# Patient Record
Sex: FEMALE | NOT HISPANIC OR LATINO | Employment: FULL TIME | URBAN - METROPOLITAN AREA
[De-identification: names, ages, dates, MRNs, and addresses within clinical notes are randomized per-mention and may not be internally consistent; named-entity substitution may affect disease eponyms.]

---

## 2022-02-25 ENCOUNTER — IMMUNIZATION (OUTPATIENT)
Dept: NURSING | Facility: CLINIC | Age: 42
End: 2022-02-25
Payer: COMMERCIAL

## 2022-02-25 PROCEDURE — 91306 COVID-19,PF,MODERNA (18+ YRS BOOSTER .25ML): CPT

## 2022-02-25 PROCEDURE — 0064A COVID-19,PF,MODERNA (18+ YRS BOOSTER .25ML): CPT

## 2022-04-30 ENCOUNTER — HEALTH MAINTENANCE LETTER (OUTPATIENT)
Age: 42
End: 2022-04-30

## 2022-09-20 ENCOUNTER — OFFICE VISIT (OUTPATIENT)
Dept: FAMILY MEDICINE | Facility: CLINIC | Age: 42
End: 2022-09-20
Payer: COMMERCIAL

## 2022-09-20 ENCOUNTER — TELEPHONE (OUTPATIENT)
Dept: ALLERGY | Facility: CLINIC | Age: 42
End: 2022-09-20

## 2022-09-20 VITALS
WEIGHT: 153 LBS | RESPIRATION RATE: 16 BRPM | OXYGEN SATURATION: 99 % | TEMPERATURE: 98.6 F | DIASTOLIC BLOOD PRESSURE: 56 MMHG | SYSTOLIC BLOOD PRESSURE: 108 MMHG | HEART RATE: 82 BPM

## 2022-09-20 DIAGNOSIS — J30.2 SEASONAL ALLERGIC RHINITIS, UNSPECIFIED TRIGGER: Primary | ICD-10-CM

## 2022-09-20 PROCEDURE — 99203 OFFICE O/P NEW LOW 30 MIN: CPT | Performed by: NURSE PRACTITIONER

## 2022-09-20 RX ORDER — MONTELUKAST SODIUM 10 MG/1
10 TABLET ORAL AT BEDTIME
Qty: 30 TABLET | Refills: 12 | Status: SHIPPED | OUTPATIENT
Start: 2022-09-20 | End: 2023-09-11

## 2022-09-20 ASSESSMENT — ENCOUNTER SYMPTOMS
WHEEZING: 1
SORE THROAT: 1

## 2022-09-20 NOTE — PROGRESS NOTES
Assessment & Plan     (J30.2) Seasonal allergic rhinitis, unspecified trigger  (primary encounter diagnosis)  Comment:   Plan: Adult Allergy/Asthma Referral, montelukast         (SINGULAIR) 10 MG tablet        Symptoms likely related to allergies, but could be residual viral symptoms or low-grade sinusitis.  May try Singulair.  Discussed the use and indication of this medication as well as potential side effects.  Referral to allergy given per patient request.         23 minutes spent on the date of the encounter doing chart review, patient visit and documentation            No follow-ups on file.    Karin Recio NP  [facility]    Ronni Morse is a 41 year old, presenting for the following health issues:  Nasal Congestion, Pharyngitis, and Allergies (Referral for allergy testing)      Pharyngitis     Allergies  Associated symptoms include a sore throat.   History of Present Illness       Reason for visit:  Sinus drainage, throat discomfort  Symptom onset:  3-4 weeks ago    She eats 0-1 servings of fruits and vegetables daily.She consumes 0 sweetened beverage(s) daily.She exercises with enough effort to increase her heart rate 9 or less minutes per day.    She is taking medications regularly.       Post-nasal drainage, sore throat, slight cough started about a month ago.  No fevers, nasal congestion, sinus pressure.  Antihistamine cause too much dryness.  She has also tried Flonase in the past as well.   She uses a nettipot.  Typically she has similar symptoms in the spring.  She did do a home COVID test initially and it was negative.  Her symptoms are starting to improve.             Review of Systems   HENT: Positive for sore throat.    Respiratory: Positive for wheezing.             Objective    /56   Pulse 82   Temp 98.6  F (37  C) (Temporal)   Resp 16   Wt 69.4 kg (153 lb)   SpO2 99%   Breastfeeding No   There is no height or weight on file to calculate  BMI.  Physical Exam   GENERAL: healthy, alert and no distress  HENT: ear canals and TM's normal, nose and mouth without ulcers or lesions  NECK: no adenopathy, no asymmetry, masses, or scars and thyroid normal to palpation  RESP: lungs clear to auscultation - no rales, rhonchi or wheezes  CV: regular rate and rhythm, normal S1 S2, no S3 or S4, no murmur, click or rub, no peripheral edema and peripheral pulses strong  PSYCH: mentation appears normal, affect normal/bright

## 2022-09-20 NOTE — TELEPHONE ENCOUNTER
[facility] Call Center    Phone Message    May a detailed message be left on voicemail: yes     Reason for Call: Other: Pt requests call back to answer her questions before her Allergy Appt - she wants to know what all kinds of testing you can provide - she said she wants a lot of testing - she also wants to know what body parts you do the testing on and if she will be sore etc after to plan her day and next day etc - she has some questions on what happens at an Appt - Thank you for calling her back - she is scheduled/wait listed     Action Taken: Message routed to:  Other: CS ALLERGY    Travel Screening: Not Applicable

## 2022-09-30 NOTE — TELEPHONE ENCOUNTER
Called patient and left a 2nd voicemail for callback to discuss questions about allergy appointment with Dr. Dudley.     Amanda Haas, BSN, RN

## 2022-10-03 NOTE — TELEPHONE ENCOUNTER
Called patient back after she called the clinic to discuss allergy testing. Explained that there is two types of allergy testing: patch and skin prick testing. Skin prick testing is what we do in the [place] office with Dr. Dudley. Patient concerned for seasonal allergies as they have gotten worse over the last couple of months. Patient is wanting to do all the testing at once so that she does not have to wonder what she is allergic to any longer. Informed patient that testing is performed on the back because this is where there is the most surface area. Reminded patient to remain off antihistamines for at least 7 days prior to her appointment, patient not currently taking. No further questions at this time.     Amanda Haas, MACN, RN

## 2022-11-20 ENCOUNTER — HEALTH MAINTENANCE LETTER (OUTPATIENT)
Age: 42
End: 2022-11-20

## 2022-12-20 ENCOUNTER — OFFICE VISIT (OUTPATIENT)
Dept: ALLERGY | Facility: CLINIC | Age: 42
End: 2022-12-20
Attending: NURSE PRACTITIONER
Payer: COMMERCIAL

## 2022-12-20 ENCOUNTER — LAB (OUTPATIENT)
Dept: LAB | Facility: CLINIC | Age: 42
End: 2022-12-20
Payer: COMMERCIAL

## 2022-12-20 VITALS — HEART RATE: 64 BPM | SYSTOLIC BLOOD PRESSURE: 124 MMHG | OXYGEN SATURATION: 100 % | DIASTOLIC BLOOD PRESSURE: 83 MMHG

## 2022-12-20 DIAGNOSIS — R09.82 POST-NASAL DRIP: ICD-10-CM

## 2022-12-20 DIAGNOSIS — R09.82 POST-NASAL DRIP: Primary | ICD-10-CM

## 2022-12-20 DIAGNOSIS — J30.2 SEASONAL ALLERGIC RHINITIS, UNSPECIFIED TRIGGER: ICD-10-CM

## 2022-12-20 PROCEDURE — 86003 ALLG SPEC IGE CRUDE XTRC EA: CPT | Mod: 59

## 2022-12-20 PROCEDURE — 86003 ALLG SPEC IGE CRUDE XTRC EA: CPT

## 2022-12-20 PROCEDURE — 95004 PERQ TESTS W/ALRGNC XTRCS: CPT | Performed by: INTERNAL MEDICINE

## 2022-12-20 PROCEDURE — 36415 COLL VENOUS BLD VENIPUNCTURE: CPT

## 2022-12-20 PROCEDURE — 99203 OFFICE O/P NEW LOW 30 MIN: CPT | Mod: 25 | Performed by: INTERNAL MEDICINE

## 2022-12-20 ASSESSMENT — ENCOUNTER SYMPTOMS
RHINORRHEA: 0
CHEST TIGHTNESS: 0
SHORTNESS OF BREATH: 0
COUGH: 0
FACIAL SWELLING: 0
ACTIVITY CHANGE: 0
EYE ITCHING: 0
FEVER: 0
MYALGIAS: 0
WHEEZING: 0
VOMITING: 0
JOINT SWELLING: 0
NAUSEA: 0
EYE REDNESS: 0
EYE DISCHARGE: 0
DIARRHEA: 0
HEADACHES: 0
ADENOPATHY: 0
ARTHRALGIAS: 0
SINUS PRESSURE: 0
CHILLS: 0

## 2022-12-20 NOTE — LETTER
12/20/2022         RE: Lito Castillo  [address] [facility] [address]        Dear Colleague,    Thank you for referring your patient, Lito Castillo, to the [facility] [place]. Please see a copy of my visit note below.    Lito Castillo was seen in the Allergy Clinic at [facility].    Lito Castillo is a 42 year old female being seen today at the request of Karin Recio NP [facility]  in consultation for Seasonal allergic rhinitis    She has had several years of postnasal drainage that was primarily in the spring which was debilitating.  Her symptoms continued to get worse and now are in the spring and fall.  She has tried multiple over-the-counter antihistamines such as Zyrtec and Allegra but it makes her feel incredibly dry and parched and does not provide any benefit.  She is dreading spring.  Singulair was prescribed but she did not try this.  She has not used any nasal sprays.    She also develops a sore throat and occasional chest congestion when she has increased postnasal drainage.  She does not complain of any eye itching or sneezing.    History reviewed. No pertinent past medical history.  History reviewed. No pertinent family history.  History reviewed. No pertinent surgical history.    ENVIRONMENTAL HISTORY:   Pets inside the house include 1 dog(s).  Do you smoke cigarettes or other recreational drugs? No There is/are 0 smokers living in the house. The house does not have a damp basement.     SOCIAL HISTORY:   Lito is employed as . She lives with her family.      Review of Systems   Constitutional: Negative for activity change, chills and fever.   HENT: Negative for congestion, dental problem, ear pain, facial swelling, nosebleeds, postnasal drip, rhinorrhea, sinus pressure and sneezing.    Eyes: Negative for discharge, redness and itching.   Respiratory: Negative for cough, chest tightness,  shortness of breath and wheezing.    Cardiovascular: Negative for chest pain.   Gastrointestinal: Negative for diarrhea, nausea and vomiting.   Musculoskeletal: Negative for arthralgias, joint swelling and myalgias.   Skin: Negative for rash.   Neurological: Negative for headaches.   Hematological: Negative for adenopathy.   Psychiatric/Behavioral: Negative for behavioral problems and self-injury.   All other systems reviewed and are negative.        Current Outpatient Medications:      montelukast (SINGULAIR) 10 MG tablet, Take 1 tablet (10 mg) by mouth At Bedtime (Patient not taking: Reported on 12/20/2022), Disp: 30 tablet, Rfl: 12  No Known Allergies      EXAM:   /83   Pulse 64   SpO2 100%     Physical Exam    Constitutional:       General: She is not in acute distress.     Appearance: Normal appearance. She is not ill-appearing.   HENT:      Head: Normocephalic and atraumatic.      Nose: Nose normal. No congestion or rhinorrhea.      Mouth/Throat:      Mouth: Mucous membranes are moist.      Pharynx: Oropharynx is clear. No posterior oropharyngeal erythema.   Eyes:      General:         Right eye: No discharge.         Left eye: No discharge.   Cardiovascular:      Rate and Rhythm: Normal rate and regular rhythm.      Heart sounds: Normal heart sounds.   Pulmonary:      Effort: Pulmonary effort is normal.      Breath sounds: Normal breath sounds. No wheezing or rhonchi.   Skin:     General: Skin is warm.      Findings: No erythema or rash.   Neurological:      General: No focal deficit present.      Mental Status: She is alert. Mental status is at baseline.   Psychiatric:         Mood and Affect: Mood normal.         Behavior: Behavior normal.     WORKUP: Skin testing was borderline positive to 2 trees.    ENVIRONMENTAL PERCUTANEOUS SKIN TESTING: ADULT  [facility] 12/20/2022   Consent Y   Ordering Physician Dr. Dudley   Interpreting Physician Dr. Dudley   Testing Technician Amanda WASSERMAN RN    Location Back   Time start:  1:29 PM   Time End:  1:44 PM   Positive Control: Histatrol*ALK 1 mg/ml 7/20   Negative Control: 50% Glycerin 0   Cat Hair*ALK (10,000 BAU/ml) 0   AP Dog Hair/Dander (1:100 w/v) 0   Dust Mite p. 30,000 AU/ml 0   Dust Mite f. (30,000 AU/ml) 0   Harsha (W/F in millimeters) 0   Darian Grass (100,000 BAU/mL) 0   Red Cedar (W/F in millimeters) 4/5   Maple/[place] (W/F in millimeters) 0   Hackberry (W/F in millimeters) 0   [place] (W/F in millimeters) 0   Calypso *ALK (W/F in millimeters) 0   American Elm (W/F in millimeters) 0   Circle (W/F in millimeters) 0   Black [place] (W/F in millimeters) 5/6   Birch Mix (W/F in millimeters) 0   Sabael (W/F in millimeters) 0   Oak (W/F in millimeters) 0   Cocklebur (W/F in millimeters) 0   [place] (W/F in millimeters) 0   White Rolly (W/F in millimeters) 0   Careless (W/F in millimeters) 0   Nettle (W/F in millimeters) 0   English Plantain (W/F in millimeters) 0   Kochia (W/F in millimeters) 0   Lamb's Quarter (W/F in millimeters) 0   Marshelder (W/F in millimeters) 0   Ragweed Mix* ALK (W/F in millimeters) 0   Russian Thistle (W/F in millimeters) 0   Sagebrush/Mugwort (W/F in millimeters) 0   Sheep Sorrel (W/F in millimeters) 0   Feather Mix* ALK (W/F in millimeters) 0   Penicillium Mix (1:10 w/v) 0   Curvularia spicifera (1:10 w/v) 0   Epicoccum (1:10 w/v) 0   Aspergillus fumigatus (1:10 w/v): 0   Alternaria tenius (1:10 w/v) 0   H. Cladosporium (1:10 w/v) 0   Phoma herbarum (1:10 w/v) 0        Verbal consent obtained for skin testing  ASSESSMENT/PLAN:  Lito Castillo is a 42 year old female seen today for allergy concerns.  Skin testing was borderline positive to 2 trees.  We will do additional blood testing for additional trees and weed allergy based on her history.  Symptoms are seasonal.  This does affect her quality of life.    1. Nasal saline irrigation  2. Flonase Sensimist 1-2 sprays each nostril daily  3. Astepro or Atrovent could be  considered  4. Allergy blood tests, if positive may consider allergy shots in the future.    Follow-up in 4 months      Thank you for allowing me to participate in the care of Lito Castillo.      I spent 30 minutes on the date of the encounter doing chart review, history and exam, documentation and further coordination as noted above exclusive of separately reported interpretations    Pee Dudley MD  Allergy/Immunology  [facility]        Per provider verbal order, placed Adult Environmental Panel scratch test. Once panels were placed, patient was monitored for 15 minutes in clinic.  Provider read test after 15 minutes.  Pt tolerated procedure well.  All questions and concerns were addressed at office visit.     MAC CrowN, RN                Again, thank you for allowing me to participate in the care of your patient.        Sincerely,        Pee Dudley MD

## 2022-12-20 NOTE — PATIENT INSTRUCTIONS
Nasal saline irrigation  Flonase Sensimist 1-2 sprays each nostril daily  Astepro or Atrovent could be considered  Allergy blood tests, if positive may consider allergy shots in the future.          Allergy Staff Appt Hours Shot Hours Location         Physician   Pee Dudley MD      Support Staff   Amanda MARES, RN   Deniz MARIE, RN   Nick PARRA, RAFAELA LOPEZ, LPN          Mondays  Not available until January/2023 Wednesdays  Close    Tuesdays Thursdays and Fridays:  [place] 7-5                    [place]     Tuesdays: 7:40-3:20      Fridays: 7:40-4:20                [facility]  [address]  [place] [address]  Appt Line: [phone]    Pulmonary Function Scheduling:  [place]: [phone]         Questions about cost of your care?  For questions about your cost of your visit, procedure, lab or imaging contact: [employer] Price Line [phone] or visit: [email or web address]    Important Scheduling Information  Appointments for skin testing: Appointment will last approximately 45 minutes.  Please call the appointment line for your clinic to schedule.  Discontinue oral antihistamines 7 days prior to the appointment.  Discontinue nasal and ocular antihistamines 4 days prior to appointment.    Appointments for challenges (oral food challenge, penicillin testing, aspirin desensitization) If your provider instructs you to that this additional testing is indicated, it will need to be scheduled directly through the allergy department.  Please contact them via [employer] or by calling the clinic and asking to speak with the allergy team.  They will provide additional information and instructions for the appointment.  Discontinue oral antihistamines 7 days prior to the appointment.  Discontinue nasal and ocular antihistamines 4 days prior to the appointment.    Thank you for trusting us with your care. Please feel free to contact us with any questions  or concerns you may have.,,

## 2022-12-20 NOTE — PROGRESS NOTES
Per provider verbal order, placed Adult Environmental Panel scratch test. Once panels were placed, patient was monitored for 15 minutes in clinic.  Provider read test after 15 minutes.  Pt tolerated procedure well.  All questions and concerns were addressed at office visit.     MAC CrowN, RN

## 2022-12-20 NOTE — PROGRESS NOTES
Lito Castillo was seen in the Allergy Clinic at [facility].    Lito Castillo is a 42 year old female being seen today at the request of Karin Recio NP [facility]  in consultation for Seasonal allergic rhinitis    She has had several years of postnasal drainage that was primarily in the spring which was debilitating.  Her symptoms continued to get worse and now are in the spring and fall.  She has tried multiple over-the-counter antihistamines such as Zyrtec and Allegra but it makes her feel incredibly dry and parched and does not provide any benefit.  She is dreading spring.  Singulair was prescribed but she did not try this.  She has not used any nasal sprays.    She also develops a sore throat and occasional chest congestion when she has increased postnasal drainage.  She does not complain of any eye itching or sneezing.    History reviewed. No pertinent past medical history.  History reviewed. No pertinent family history.  History reviewed. No pertinent surgical history.    ENVIRONMENTAL HISTORY:   Pets inside the house include 1 dog(s).  Do you smoke cigarettes or other recreational drugs? No There is/are 0 smokers living in the house. The house does not have a damp basement.     SOCIAL HISTORY:   Lito is employed as . She lives with her family.      Review of Systems   Constitutional: Negative for activity change, chills and fever.   HENT: Negative for congestion, dental problem, ear pain, facial swelling, nosebleeds, postnasal drip, rhinorrhea, sinus pressure and sneezing.    Eyes: Negative for discharge, redness and itching.   Respiratory: Negative for cough, chest tightness, shortness of breath and wheezing.    Cardiovascular: Negative for chest pain.   Gastrointestinal: Negative for diarrhea, nausea and vomiting.   Musculoskeletal: Negative for arthralgias, joint swelling and myalgias.   Skin: Negative for rash.   Neurological:  Negative for headaches.   Hematological: Negative for adenopathy.   Psychiatric/Behavioral: Negative for behavioral problems and self-injury.   All other systems reviewed and are negative.        Current Outpatient Medications:      montelukast (SINGULAIR) 10 MG tablet, Take 1 tablet (10 mg) by mouth At Bedtime (Patient not taking: Reported on 12/20/2022), Disp: 30 tablet, Rfl: 12  No Known Allergies      EXAM:   /83   Pulse 64   SpO2 100%     Physical Exam    Constitutional:       General: She is not in acute distress.     Appearance: Normal appearance. She is not ill-appearing.   HENT:      Head: Normocephalic and atraumatic.      Nose: Nose normal. No congestion or rhinorrhea.      Mouth/Throat:      Mouth: Mucous membranes are moist.      Pharynx: Oropharynx is clear. No posterior oropharyngeal erythema.   Eyes:      General:         Right eye: No discharge.         Left eye: No discharge.   Cardiovascular:      Rate and Rhythm: Normal rate and regular rhythm.      Heart sounds: Normal heart sounds.   Pulmonary:      Effort: Pulmonary effort is normal.      Breath sounds: Normal breath sounds. No wheezing or rhonchi.   Skin:     General: Skin is warm.      Findings: No erythema or rash.   Neurological:      General: No focal deficit present.      Mental Status: She is alert. Mental status is at baseline.   Psychiatric:         Mood and Affect: Mood normal.         Behavior: Behavior normal.     WORKUP: Skin testing was borderline positive to 2 trees.    ENVIRONMENTAL PERCUTANEOUS SKIN TESTING: ADULT  [facility] 12/20/2022   Consent Y   Ordering Physician Dr. Dudley   Interpreting Physician Dr. Dudley   Testing Technician Amanda WASSERMAN RN   Location Back   Time start:  1:29 PM   Time End:  1:44 PM   Positive Control: Histatrol*ALK 1 mg/ml 7/20   Negative Control: 50% Glycerin 0   Cat Hair*ALK (10,000 BAU/ml) 0   AP Dog Hair/Dander (1:100 w/v) 0   Dust Mite p. 30,000 AU/ml 0   Dust Mite f. (30,000  AU/ml) 0   Harsha (W/F in millimeters) 0   Darian Grass (100,000 BAU/mL) 0   Red Cedar (W/F in millimeters) 4/5   Maple/[place] (W/F in millimeters) 0   Hackberry (W/F in millimeters) 0   Divide (W/F in millimeters) 0   [place] *ALK (W/F in millimeters) 0   American Elm (W/F in millimeters) 0   [place] (W/F in millimeters) 0   Black [place] (W/F in millimeters) 5/6   Birch Mix (W/F in millimeters) 0   Walker (W/F in millimeters) 0   Oak (W/F in millimeters) 0   Cocklebur (W/F in millimeters) 0   [place] (W/F in millimeters) 0   White Rolly (W/F in millimeters) 0   Careless (W/F in millimeters) 0   Nettle (W/F in millimeters) 0   English Plantain (W/F in millimeters) 0   Kochia (W/F in millimeters) 0   Lamb's Quarter (W/F in millimeters) 0   Marshelder (W/F in millimeters) 0   Ragweed Mix* ALK (W/F in millimeters) 0   Russian Thistle (W/F in millimeters) 0   Sagebrush/Mugwort (W/F in millimeters) 0   Sheep Sorrel (W/F in millimeters) 0   Feather Mix* ALK (W/F in millimeters) 0   Penicillium Mix (1:10 w/v) 0   Curvularia spicifera (1:10 w/v) 0   Epicoccum (1:10 w/v) 0   Aspergillus fumigatus (1:10 w/v): 0   Alternaria tenius (1:10 w/v) 0   H. Cladosporium (1:10 w/v) 0   Phoma herbarum (1:10 w/v) 0        Verbal consent obtained for skin testing  ASSESSMENT/PLAN:  Lito Castillo is a 42 year old female seen today for allergy concerns.  Skin testing was borderline positive to 2 trees.  We will do additional blood testing for additional trees and weed allergy based on her history.  Symptoms are seasonal.  This does affect her quality of life.    1. Nasal saline irrigation  2. Flonase Sensimist 1-2 sprays each nostril daily  3. Astepro or Atrovent could be considered  4. Allergy blood tests, if positive may consider allergy shots in the future.    Follow-up in 4 months      Thank you for allowing me to participate in the care of Lito Castillo.      I spent 30 minutes on the date of the encounter doing chart  review, history and exam, documentation and further coordination as noted above exclusive of separately reported interpretations    Pee Dudley MD  Allergy/Immunology  [facility]

## 2022-12-22 LAB
CALIF WALNUT POLN IGE QN: <0.1 KU(A)/L
CEDAR IGE QN: <0.1 KU(A)/L
COMMON RAGWEED IGE QN: <0.1 KU(A)/L
COTTONWOOD IGE QN: <0.1 KU(A)/L
EAST WHITE PINE IGE QN: <0.1 KU(A)/L
ENGL PLANTAIN IGE QN: <0.1 KU(A)/L
FIREBUSH IGE QN: <0.1 KU(A)/L
GIANT RAGWEED IGE QN: <0.1 KU(A)/L
GOOSEFOOT IGE QN: <0.1 KU(A)/L
MAPLE IGE QN: <0.1 KU(A)/L
RED MULBERRY IGE QN: <0.1 KU(A)/L
SALTWORT IGE QN: <0.1 KU(A)/L
SILVER BIRCH IGE QN: <0.1 KU(A)/L
TIMOTHY IGE QN: <0.1 KU(A)/L
WHITE ASH IGE QN: <0.1 KU(A)/L
WHITE ELM IGE QN: <0.1 KU(A)/L
WHITE MULBERRY IGE QN: <0.1 KU(A)/L
WHITE OAK IGE QN: <0.1 KU(A)/L

## 2023-02-23 ENCOUNTER — PRENATAL OFFICE VISIT (OUTPATIENT)
Dept: NURSING | Facility: CLINIC | Age: 43
End: 2023-02-23

## 2023-02-23 VITALS — HEIGHT: 65 IN | BODY MASS INDEX: 25.86 KG/M2

## 2023-02-23 DIAGNOSIS — Z23 NEED FOR TDAP VACCINATION: ICD-10-CM

## 2023-02-23 DIAGNOSIS — O09.529 ENCOUNTER FOR SUPERVISION OF MULTIGRAVIDA WITH ADVANCED MATERNAL AGE: Primary | ICD-10-CM

## 2023-02-23 PROBLEM — D17.20 LIPOMA OF AXILLA: Status: ACTIVE | Noted: 2017-06-07

## 2023-02-23 PROBLEM — N61.20 GRANULOMATOUS MASTITIS: Status: ACTIVE | Noted: 2020-05-16

## 2023-02-23 PROBLEM — R87.810 CERVICAL HIGH RISK HPV (HUMAN PAPILLOMAVIRUS) TEST POSITIVE: Status: ACTIVE | Noted: 2021-08-31

## 2023-02-23 PROCEDURE — 99207 PR NO CHARGE NURSE ONLY: CPT

## 2023-02-23 NOTE — PROGRESS NOTES
Important Information for Provider:     New ob nurse intake by phone, second pregnancy,AMA . Handouts reviewed. Declined genetic screening. Ultrasound scheduled for 3/03/23 with CNM to call with results. NOB with CNM 3/10/23    Caffeine intake/servings daily - 1  Calcium intake/servings daily - 3  Exercise 5 times weekly - describe ;walks, precautions givne  Sunscreen used - Yes  Seatbelts used - Yes  Guns stored in the home - No  Self Breast Exam - Yes  Pap test up to date -  Yes  Dental exam up to date -  Yes  Immunizations reviewed and up to date - Yes  Abuse: Current or Past (Physical, Sexual or Emotional) - No  Do you feel safe in your environment - Yes  Do you cope well with stress - Yes    Prenatal OB Questionnaire  Patient supplied answers from flow sheet for:  Prenatal OB Questionnaire.  Past Medical History  Have you ever received care for your mental health? : No  Have you ever been in a major accident or suffered serious trauma?: No  Within the last year, has anyone hit, slapped, kicked or otherwise hurt you?: No  In the last year, has anyone forced you to have sex when you didn't want to?: No    Past Medical History 2   Have you ever received a blood transfusion?: No  Would you accept a blood transfusion if was medically recommended?: Yes  Does anyone in your home smoke?: No   Is your blood type Rh negative?: No  Have you ever ?: (!) Yes  Have you been hospitalized for a nonsurgical reason excluding normal delivery?: No  Have you ever had an abnormal pap smear?: (!) Yes    Past Medical History (Continued)  Do you have a history of abnormalities of the uterus?: No  Did your mother take SARAH or any other hormones when she was pregnant with you?: No  Do you have any other problems we have not asked about which you feel may be important to this pregnancy?: No                     Allergies as of 2/23/2023:    Allergies as of 02/23/2023     (No Known Allergies)       Current medications are:  Current  Outpatient Medications   Medication Sig Dispense Refill     montelukast (SINGULAIR) 10 MG tablet Take 1 tablet (10 mg) by mouth At Bedtime 30 tablet 12     Prenatal Vit-DSS-Fe Cbn-FA (PRENATAL AD PO)            Early ultrasound screening tool:    Does patient have irregular periods?  No  Did patient use hormonal birth control in the three months prior to positive urine pregnancy test? No  Is the patient breastfeeding?  No  Is the patient 10 weeks or greater at time of education visit?  No

## 2023-02-25 ENCOUNTER — NURSE TRIAGE (OUTPATIENT)
Dept: NURSING | Facility: CLINIC | Age: 43
End: 2023-02-25
Payer: COMMERCIAL

## 2023-02-25 NOTE — TELEPHONE ENCOUNTER
OB Triage Call    Is patient's OB/Midwife with the [facility] or [facility]? [facility]- Proceed with triage     Reason for call: Patient is about 8 weeks pregnancy and started having mild vaginal bleeding one hour ago    Assessment:   Mild vaginal bleeding - tiny blood clumps, but has not soak through one pad yet  No pain with urination  No abdominal cramping  No fever  Extreme fatigue    Plan: Monitor bleeding at home. Will call back if symptoms worsen and call Monday to schedule an in-clinic appointment.    Patient plans to deliver at unknown    Patient's primary OB Provider is not yet established.    Per protocol recommendations Patient to schedule follow up appointment within 3 days.      Is patient's delivering hospital on divert? Does not apply due to Patient given home care instructions      9w4d    Estimated Date of Delivery: Sep 26, 2023        OB History    Para Term  AB Living   2 1 1 0 0 1   SAB IAB Ectopic Multiple Live Births   0 0 0 0 1      # Outcome Date GA Lbr Benji/2nd Weight Sex Delivery Anes PTL Lv   2 Current            1 Term 17 39w5d  3.606 kg (7 lb 15.2 oz) F   N VANESA       No results found for: GBS       Julia Juarez RN 23 11:55 AM  [facility] Nurse Advisor    Reason for Disposition    MILD vaginal bleeding (i.e., less than 1 pad / hour; less than patient's usual menstrual bleeding; not just spotting)    Additional Information    Negative: Shock suspected (e.g., cold/pale/clammy skin, too weak to stand, low BP, rapid pulse)    Negative: Difficult to awaken or acting confused (e.g., disoriented, slurred speech)    Negative: Passed out (i.e., lost consciousness, collapsed and was not responding)    Negative: Sounds like a life-threatening emergency to the triager    Negative: [1] Vaginal bleeding AND [2] pregnant 20 or more weeks    Negative: Not pregnant or pregnancy status unknown    Negative: SEVERE abdominal pain    Negative: [1] SEVERE vaginal  "bleeding (e.g., soaking 2 pads / hour, large blood clots) AND [2] present 2 or more hours    Negative: SEVERE dizziness (e.g., unable to stand, requires support to walk, feels like passing out)    Negative: Passed tissue (e.g., gray-white)    Negative: [1] MODERATE vaginal bleeding (e.g., soaking 1 pad per hour; clots) AND [2] pregnant > 12 weeks    Negative: [1] MODERATE vaginal bleeding (e.g., soaking 1 pad per hour; clots) AND [2] present > 6 hours    Negative: Shoulder pain    Negative: Pale skin (pallor) of new-onset or worsening    Negative: Patient sounds very sick or weak to the triager    Negative: [1] Constant abdominal pain AND [2] present > 2 hours    Negative: Fever > 100.4 F (38.0 C)    Negative: [1] Intermittent lower abdominal pain (e.g., cramping) AND [2] present > 24 hours    Negative: Prior history of \"ectopic pregnancy\" or previous tubal surgery (e.g., tubal ligation)    Negative: Pain or burning with passing urine (urination)    Negative: MODERATE vaginal bleeding (e.g., soaking 1 pad per hour; clots)    Negative: Has IUD    Protocols used: PREGNANCY - VAGINAL BLEEDING LESS THAN 20 WEEKS EG-A-      "

## 2023-02-27 ENCOUNTER — ANCILLARY PROCEDURE (OUTPATIENT)
Dept: ULTRASOUND IMAGING | Facility: CLINIC | Age: 43
End: 2023-02-27
Attending: OBSTETRICS & GYNECOLOGY
Payer: COMMERCIAL

## 2023-02-27 ENCOUNTER — OFFICE VISIT (OUTPATIENT)
Dept: OBGYN | Facility: CLINIC | Age: 43
End: 2023-02-27
Payer: COMMERCIAL

## 2023-02-27 VITALS
WEIGHT: 154.8 LBS | SYSTOLIC BLOOD PRESSURE: 112 MMHG | HEART RATE: 78 BPM | BODY MASS INDEX: 26.16 KG/M2 | DIASTOLIC BLOOD PRESSURE: 66 MMHG

## 2023-02-27 DIAGNOSIS — O20.9 BLEEDING IN EARLY PREGNANCY: ICD-10-CM

## 2023-02-27 DIAGNOSIS — O20.9 BLEEDING IN EARLY PREGNANCY: Primary | ICD-10-CM

## 2023-02-27 DIAGNOSIS — R53.83 FATIGUE, UNSPECIFIED TYPE: ICD-10-CM

## 2023-02-27 LAB
ERYTHROCYTE [DISTWIDTH] IN BLOOD BY AUTOMATED COUNT: 12.2 % (ref 10–15)
HCT VFR BLD AUTO: 32.2 % (ref 35–47)
HGB BLD-MCNC: 10.5 G/DL (ref 11.7–15.7)
MCH RBC QN AUTO: 31.8 PG (ref 26.5–33)
MCHC RBC AUTO-ENTMCNC: 32.6 G/DL (ref 31.5–36.5)
MCV RBC AUTO: 98 FL (ref 78–100)
PLATELET # BLD AUTO: 231 10E3/UL (ref 150–450)
RBC # BLD AUTO: 3.3 10E6/UL (ref 3.8–5.2)
WBC # BLD AUTO: 10.3 10E3/UL (ref 4–11)

## 2023-02-27 PROCEDURE — 85027 COMPLETE CBC AUTOMATED: CPT | Performed by: OBSTETRICS & GYNECOLOGY

## 2023-02-27 PROCEDURE — 76817 TRANSVAGINAL US OBSTETRIC: CPT | Performed by: OBSTETRICS & GYNECOLOGY

## 2023-02-27 PROCEDURE — 99203 OFFICE O/P NEW LOW 30 MIN: CPT | Performed by: OBSTETRICS & GYNECOLOGY

## 2023-02-27 PROCEDURE — 36415 COLL VENOUS BLD VENIPUNCTURE: CPT | Performed by: OBSTETRICS & GYNECOLOGY

## 2023-02-27 PROCEDURE — 76801 OB US < 14 WKS SINGLE FETUS: CPT | Performed by: OBSTETRICS & GYNECOLOGY

## 2023-02-27 NOTE — PROGRESS NOTES
Vaginal bleeding in pregnancy  Fatigue, palpitations    Ms.Tashi Castillo 42 year old  9w6d by LMP Patient's last menstrual period was 2022.  Presents for vaginal bleeding that started on Sat . Initially started with spotting, but reports that last night the bleeding became heavier with passage of a white colored tissue/sac that she suspects was the pregnancy. Since passage of that tissue, she reports bleeding is not as heavy.  Declines a pelvic exam at this time.   Pregnancy was otherwise naturally conceived.   She also reports fatigue and palpitations; acknowledges history of anemia. Denies pre-syncopal spells/vertiginous symptoms. Reports taking iron tablets to help with this.       Past Medical History:   Diagnosis Date     Abnormal Pap smear of cervix      Cervical high risk HPV (human papillomavirus) test positive 2021     Granulomatous mastitis 2020       Past Surgical History:   Procedure Laterality Date     BIOPSY CERVICAL, LOCAL EXCISION, SINGLE/MULTIPLE       COLPOSCOPY         Current Outpatient Medications   Medication     montelukast (SINGULAIR) 10 MG tablet     Prenatal Vit-DSS-Fe Cbn-FA (PRENATAL AD PO)     No current facility-administered medications for this visit.       No Known Allergies    Social History     Tobacco Use     Smoking status: Never     Smokeless tobacco: Never   Vaping Use     Vaping Use: Never used   Substance Use Topics     Alcohol use: Not Currently     Drug use: Never       Family History   Problem Relation Age of Onset     Diabetes Mother      Stomach Cancer Father      ROS: 10 point review of systems negative except for pertinent positives stated in the HPI    Exam:   /66 (BP Location: Left arm, Cuff Size: Adult Regular)   Pulse 78   Wt 70.2 kg (154 lb 12.8 oz)   LMP 2022   BMI 26.16 kg/m    General Appearance: Well nourished, well developed female, NAD, AOx3  Neurological: Mental Status Normal and Station and Gait Normal   HEET:  Atraumatic, normocephalic  Pelvic: deferred    A/P:     1) Vaginal bleeding in pregnancy  -- explained to patient given her reported history, that in all likelihood, she had a miscarriage  -- however, informed her about standard of care would be to verify this by performing an ultrasound and serial beta HCG quant levels  -- patient is agrees to ultrasound, but declines beta HCG quant level testing  -- will determine her clinical situation based on finalized ultrasound report; in the meantime, patient was counseled on miscarriage/retained products of conception and bleeding precautions; ectopic pregnancy precautions not reviewed given passage of what appeared to be pregnancy tissue out from her vagina   -- reviewed of Care Everywhere records show Rh positive status     2) Fatigue, palpitations   -- recommended CBC testing to assess her Hgb, to which she agrees  -- will determine additional intervention based on results ie IV iron therapy vs need for blood transfusion  -- also reviewed signs and symptoms of worsening anemia precautions to return to ER for emergent evaluation/intervention  - patient conveys understanding of this    Total time spent was 20 minutes on the date of the encounter in chart review, patient visit, review of tests, documentation and counseling on the above medical conditions.    Lani Kilgore MD  [facility]

## 2023-02-27 NOTE — NURSING NOTE
"Chief Complaint   Patient presents with     Consult     Bleeding in early pregnancy Approx 9 weeks 6 days        Initial /66 (BP Location: Left arm, Cuff Size: Adult Regular)   Pulse 78   Wt 70.2 kg (154 lb 12.8 oz)   LMP 2022   BMI 26.16 kg/m   Estimated body mass index is 26.16 kg/m  as calculated from the following:    Height as of 23: 1.638 m (5' 4.5\").    Weight as of this encounter: 70.2 kg (154 lb 12.8 oz).  BP completed using cuff size: regular    Questioned patient about current smoking habits.  Pt. has never smoked.          The following HM Due: NONE      Katie Fernandez, AGUSTÍN on 2023 at 9:27 AM  "

## 2023-09-11 ENCOUNTER — OFFICE VISIT (OUTPATIENT)
Dept: FAMILY MEDICINE | Facility: CLINIC | Age: 43
End: 2023-09-11
Payer: COMMERCIAL

## 2023-09-11 VITALS
TEMPERATURE: 98.7 F | DIASTOLIC BLOOD PRESSURE: 60 MMHG | HEIGHT: 64 IN | SYSTOLIC BLOOD PRESSURE: 102 MMHG | HEART RATE: 75 BPM | OXYGEN SATURATION: 100 % | WEIGHT: 148.3 LBS | RESPIRATION RATE: 16 BRPM | BODY MASS INDEX: 25.32 KG/M2

## 2023-09-11 DIAGNOSIS — Z12.31 VISIT FOR SCREENING MAMMOGRAM: ICD-10-CM

## 2023-09-11 DIAGNOSIS — Z23 NEED FOR PROPHYLACTIC VACCINATION AND INOCULATION AGAINST INFLUENZA: ICD-10-CM

## 2023-09-11 DIAGNOSIS — N60.41 MAMMARY DUCT ECTASIA OF RIGHT BREAST: ICD-10-CM

## 2023-09-11 DIAGNOSIS — Z00.00 ANNUAL PHYSICAL EXAM: Primary | ICD-10-CM

## 2023-09-11 DIAGNOSIS — Z12.4 CERVICAL CANCER SCREENING: ICD-10-CM

## 2023-09-11 DIAGNOSIS — D17.20 LIPOMA OF AXILLA, UNSPECIFIED LATERALITY: ICD-10-CM

## 2023-09-11 LAB
ANION GAP SERPL CALCULATED.3IONS-SCNC: 13 MMOL/L (ref 7–15)
BUN SERPL-MCNC: 11.1 MG/DL (ref 6–20)
CALCIUM SERPL-MCNC: 9.2 MG/DL (ref 8.6–10)
CHLORIDE SERPL-SCNC: 103 MMOL/L (ref 98–107)
CHOLEST SERPL-MCNC: 219 MG/DL
CREAT SERPL-MCNC: 0.67 MG/DL (ref 0.51–0.95)
DEPRECATED HCO3 PLAS-SCNC: 23 MMOL/L (ref 22–29)
EGFRCR SERPLBLD CKD-EPI 2021: >90 ML/MIN/1.73M2
ERYTHROCYTE [DISTWIDTH] IN BLOOD BY AUTOMATED COUNT: 11.7 % (ref 10–15)
GLUCOSE SERPL-MCNC: 95 MG/DL (ref 70–99)
HCT VFR BLD AUTO: 33.4 % (ref 35–47)
HDLC SERPL-MCNC: 61 MG/DL
HGB BLD-MCNC: 11.1 G/DL (ref 11.7–15.7)
LDLC SERPL CALC-MCNC: 145 MG/DL
MCH RBC QN AUTO: 32.2 PG (ref 26.5–33)
MCHC RBC AUTO-ENTMCNC: 33.2 G/DL (ref 31.5–36.5)
MCV RBC AUTO: 97 FL (ref 78–100)
NONHDLC SERPL-MCNC: 158 MG/DL
PLATELET # BLD AUTO: 241 10E3/UL (ref 150–450)
POTASSIUM SERPL-SCNC: 4 MMOL/L (ref 3.4–5.3)
RBC # BLD AUTO: 3.45 10E6/UL (ref 3.8–5.2)
SODIUM SERPL-SCNC: 139 MMOL/L (ref 136–145)
TRIGL SERPL-MCNC: 64 MG/DL
WBC # BLD AUTO: 5.8 10E3/UL (ref 4–11)

## 2023-09-11 PROCEDURE — 90686 IIV4 VACC NO PRSV 0.5 ML IM: CPT | Performed by: INTERNAL MEDICINE

## 2023-09-11 PROCEDURE — 80061 LIPID PANEL: CPT | Performed by: INTERNAL MEDICINE

## 2023-09-11 PROCEDURE — G0145 SCR C/V CYTO,THINLAYER,RESCR: HCPCS | Performed by: INTERNAL MEDICINE

## 2023-09-11 PROCEDURE — 36415 COLL VENOUS BLD VENIPUNCTURE: CPT | Performed by: INTERNAL MEDICINE

## 2023-09-11 PROCEDURE — 99396 PREV VISIT EST AGE 40-64: CPT | Mod: 25 | Performed by: INTERNAL MEDICINE

## 2023-09-11 PROCEDURE — 87624 HPV HI-RISK TYP POOLED RSLT: CPT | Performed by: INTERNAL MEDICINE

## 2023-09-11 PROCEDURE — 85027 COMPLETE CBC AUTOMATED: CPT | Performed by: INTERNAL MEDICINE

## 2023-09-11 PROCEDURE — 80048 BASIC METABOLIC PNL TOTAL CA: CPT | Performed by: INTERNAL MEDICINE

## 2023-09-11 PROCEDURE — 90471 IMMUNIZATION ADMIN: CPT | Performed by: INTERNAL MEDICINE

## 2023-09-11 PROCEDURE — 99212 OFFICE O/P EST SF 10 MIN: CPT | Mod: 25 | Performed by: INTERNAL MEDICINE

## 2023-09-11 RX ORDER — MULTIVITAMIN,THERAPEUTIC
1 TABLET ORAL DAILY
COMMUNITY
End: 2024-09-27

## 2023-09-11 ASSESSMENT — ENCOUNTER SYMPTOMS: BREAST MASS: 0

## 2023-09-11 ASSESSMENT — PAIN SCALES - GENERAL: PAINLEVEL: NO PAIN (0)

## 2023-09-11 NOTE — PROGRESS NOTES
SUBJECTIVE:   CC: Lito is an 42 year old who presents for preventive health visit.     Healthy Habits:     Getting at least 3 servings of Calcium per day:  Yes    Bi-annual eye exam:  NO    Dental care twice a year:  Yes    Sleep apnea or symptoms of sleep apnea:  None    Diet:  Regular (no restrictions)    Frequency of exercise:  2-3 days/week    Duration of exercise:  15-30 minutes    Taking medications regularly:  Yes    Medication side effects:  None    Additional concerns today:  No    Lito is a very pleasant 42 year old lady who presented to the clinic for annual physical exam.  She has hx of right breast ductal ectasia, found on biopsy of a lump, recommend yearly mammograms now.   She has hx of abnormal pap, colposcopy in  showed atypical squamous metaplasia. No pap since then. This was done at [facility].   She has no concerns today. She has one kid, works in [employer] She had a miscarriage 6 months ago       Have you ever done Advance Care Planning? (For example, a Health Directive, POLST, or a discussion with a medical provider or your loved ones about your wishes): Yes, advance care planning is on file.    Social History     Tobacco Use    Smoking status: Never    Smokeless tobacco: Never   Substance Use Topics    Alcohol use: Yes     Comment: 1 glass wine per week           2023    10:31 AM   Alcohol Use   Prescreen: >3 drinks/day or >7 drinks/week? No     Reviewed orders with patient.  Reviewed health maintenance and updated orders accordingly - Yes  Lab work is in process    Breast Cancer Screenin/11/2023    10:32 AM   Breast CA Risk Assessment (FHS-7)   Do you have a family history of breast, colon, or ovarian cancer? No / Unknown     Mammogram Screening - Offered annual screening and updated Health Maintenance for mutual plan based on risk factor consideration  Pertinent mammograms are reviewed under the imaging tab.    History of abnormal Pap smear: NO - age 30-65 PAP  "every 5 years with negative HPV co-testing recommended     Reviewed and updated as needed this visit by clinical staff   Tobacco  Allergies  Meds              Reviewed and updated as needed this visit by Provider                 Review of Systems   Breasts:  Negative for tenderness, breast mass and discharge.   Genitourinary:  Positive for vaginal bleeding. Negative for pelvic pain and vaginal discharge.     OBJECTIVE:   /60 (BP Location: Right arm, Patient Position: Sitting, Cuff Size: Adult Large)   Pulse 75   Temp 98.7  F (37.1  C) (Tympanic)   Resp 16   Ht 1.626 m (5' 4\")   Wt 67.3 kg (148 lb 4.8 oz)   LMP 09/08/2023 (Exact Date)   SpO2 100%   Breastfeeding Unknown   BMI 25.46 kg/m    Physical Exam  Vitals reviewed.   Constitutional:       Appearance: Normal appearance.   HENT:      Right Ear: Tympanic membrane normal. There is no impacted cerumen.      Left Ear: Tympanic membrane normal. There is no impacted cerumen.   Cardiovascular:      Rate and Rhythm: Normal rate and regular rhythm.      Heart sounds: Normal heart sounds. No murmur heard.     No gallop.   Pulmonary:      Effort: Pulmonary effort is normal. No respiratory distress.      Breath sounds: Normal breath sounds. No stridor. No wheezing, rhonchi or rales.   Chest:   Breasts:     Right: No swelling, bleeding, inverted nipple, mass, nipple discharge, skin change or tenderness.      Left: No swelling, bleeding, inverted nipple, mass, nipple discharge, skin change or tenderness.   Abdominal:      General: Abdomen is flat. There is no distension.      Palpations: Abdomen is soft. There is no mass.      Tenderness: There is no abdominal tenderness. There is no guarding.      Hernia: No hernia is present.   Musculoskeletal:         General: Normal range of motion.      Cervical back: Normal range of motion and neck supple. No rigidity or tenderness.      Right lower leg: No edema.      Left lower leg: No edema.   Lymphadenopathy:      " Cervical: No cervical adenopathy.   Skin:     General: Skin is warm and dry.   Neurological:      General: No focal deficit present.      Mental Status: She is alert.         ASSESSMENT/PLAN:   Lito was seen today for physical and imm/inj.    Diagnoses and all orders for this visit:    Annual physical exam  -     CBC with Platelets (Today); Future  -     Basic metabolic panel; Future  -     Lipid Profile; Future    Lipoma of axilla, unspecified laterality  On exam, stable.    Mammary duct ectasia of right breast  Yearly mammogram. She is due.     Cervical cancer screening  -     Pap thin layer screen with HPV - recommended age 30 - 65 years; Future  -     Pap thin layer screen with HPV - recommended age 30 - 65 years    Visit for screening mammogram  -     *MA Screening Digital Bilateral; Future    Need for prophylactic vaccination and inoculation against influenza  Flu shot     Other orders  -     INFLUENZA VACCINE IM > 6 MONTHS VALENT IIV4 (AFLURIA/FLUZONE)        Patient has been advised of split billing requirements and indicates understanding: Yes      COUNSELING:  Reviewed preventive health counseling, as reflected in patient instructions       Immunizations  Vaccinated for: Influenza          She reports that she has never smoked. She has never used smokeless tobacco.          Kyra Garsia MD  [facility]

## 2023-09-11 NOTE — PATIENT INSTRUCTIONS
You are due for mammogram.  Please call the following number to make appointment :  [phone]  It is located in [address]

## 2023-09-13 LAB
BKR LAB AP GYN ADEQUACY: NORMAL
BKR LAB AP GYN INTERPRETATION: NORMAL
BKR LAB AP HPV REFLEX: NORMAL
BKR LAB AP LMP: NORMAL
BKR LAB AP PREVIOUS ABNL DX: NORMAL
BKR LAB AP PREVIOUS ABNORMAL: NORMAL
PATH REPORT.COMMENTS IMP SPEC: NORMAL
PATH REPORT.COMMENTS IMP SPEC: NORMAL
PATH REPORT.RELEVANT HX SPEC: NORMAL

## 2023-09-14 ENCOUNTER — PATIENT OUTREACH (OUTPATIENT)
Dept: FAMILY MEDICINE | Facility: CLINIC | Age: 43
End: 2023-09-14
Payer: COMMERCIAL

## 2023-09-14 PROBLEM — D06.9 HIGH GRADE SQUAMOUS INTRAEPITHELIAL LESION (HGSIL), GRADE 3 CIN, ON BIOPSY OF CERVIX: Status: ACTIVE | Noted: 2021-08-31

## 2023-09-14 LAB
HUMAN PAPILLOMA VIRUS 16 DNA: NEGATIVE
HUMAN PAPILLOMA VIRUS 18 DNA: NEGATIVE
HUMAN PAPILLOMA VIRUS FINAL DIAGNOSIS: ABNORMAL
HUMAN PAPILLOMA VIRUS OTHER HR: POSITIVE

## 2023-10-13 ENCOUNTER — HOSPITAL ENCOUNTER (OUTPATIENT)
Dept: MAMMOGRAPHY | Facility: CLINIC | Age: 43
Discharge: HOME | End: 2023-10-13
Attending: INTERNAL MEDICINE | Admitting: INTERNAL MEDICINE
Payer: COMMERCIAL

## 2023-10-13 DIAGNOSIS — Z12.31 VISIT FOR SCREENING MAMMOGRAM: ICD-10-CM

## 2023-10-13 PROCEDURE — 77067 SCR MAMMO BI INCL CAD: CPT

## 2023-10-27 ENCOUNTER — OFFICE VISIT (OUTPATIENT)
Dept: OBGYN | Facility: CLINIC | Age: 43
End: 2023-10-27
Payer: COMMERCIAL

## 2023-10-27 VITALS
HEIGHT: 64 IN | BODY MASS INDEX: 24.75 KG/M2 | SYSTOLIC BLOOD PRESSURE: 102 MMHG | DIASTOLIC BLOOD PRESSURE: 68 MMHG | WEIGHT: 145 LBS

## 2023-10-27 DIAGNOSIS — R87.810 CERVICAL HIGH RISK HPV (HUMAN PAPILLOMAVIRUS) TEST POSITIVE: Primary | ICD-10-CM

## 2023-10-27 DIAGNOSIS — Z11.3 SCREEN FOR STD (SEXUALLY TRANSMITTED DISEASE): ICD-10-CM

## 2023-10-27 DIAGNOSIS — Z87.410 HISTORY OF CERVICAL DYSPLASIA: ICD-10-CM

## 2023-10-27 DIAGNOSIS — N84.1 CERVICAL POLYP: ICD-10-CM

## 2023-10-27 PROCEDURE — 57454 BX/CURETT OF CERVIX W/SCOPE: CPT | Performed by: OBSTETRICS & GYNECOLOGY

## 2023-10-27 PROCEDURE — 88305 TISSUE EXAM BY PATHOLOGIST: CPT | Performed by: PATHOLOGY

## 2023-10-27 PROCEDURE — 87591 N.GONORRHOEAE DNA AMP PROB: CPT | Performed by: OBSTETRICS & GYNECOLOGY

## 2023-10-27 PROCEDURE — 87491 CHLMYD TRACH DNA AMP PROBE: CPT | Performed by: OBSTETRICS & GYNECOLOGY

## 2023-10-27 NOTE — PROGRESS NOTES
Colposcopy Note    Past History:     Patient's last menstrual period was 2023 (exact date).  Patient is not pregnant.     Previous Abnormal Pap Hx:   ASCUS pap, + HR HPV  10/4/04 [facility] - JEANNETTE 1   NIL pap   LSIL pap, neg HPV  11 [facility] - negative  2012: NILM pap  2015: LSIL HPV+ (16 & other). [facility] JEANNETTE 3.  8/31/15 [facility] JEANNETTE 3  2016: NILM HPV-  2017: NILM HPV-  : NILM HPV+ other. Plan, per ASCCP guidelines: Colposcopy  21 [facility] - negative  23 NIL pap, + HR HPV (neg 16/18).       Indications:  Encounter Diagnoses   Name Primary?    Cervical high risk HPV (human papillomavirus) test positive Yes    History of cervical dysplasia     Cervical polyp     Screen for STD (sexually transmitted disease)        PROCEDURE:  The procedure was explained, and informed consent obtained. The patient was placed in the dorsal lithotomy position. A vaginal speculum was placed. A GC/Chlamydia culture was obtained. There is a 5-6 mm endocervical polyp located just outside the os. An attempt to remove it with a Edison stone forceps was unsuccessful due to the polyp being too slippery to achieve an adequate grasp with the forceps.  So it was removed with a Tischler after the colposcopic exam was done. Dilute acetic acid was applied to cervix. The exocervix was visualized. The transformation zone was visualized satisfactorily. Colposcopy was done with white light and with the Glasco light. Endocervical curettage was done. Biopsy done at the 8 o clock position(s) as well as excision of the polyp noted above. These were sent separately. Colposcopy of vagina revealed: normal. The patient tolerated the procedure well. At the completion of the procedure, only scant bleeding was present. Hemostasis was achieved with Monsel's solution.    FINDINGS:  White epithelium @ 8 o clock position(s).  Punctation: absent  Mosaicism: absent    Physical Exam  Genitourinary:              ASSESSMENT:  Encounter Diagnoses    Name Primary?    Cervical high risk HPV (human papillomavirus) test positive Yes    History of cervical dysplasia     Cervical polyp     Screen for STD (sexually transmitted disease)        PLAN:  If B[facility] shows JEANNETTE 1 or less, follow-up with Ob/Gyn per ASCCP Guideline in 1 year for Pap & HPV cotest at next annual exam.     Procedure Planned:   If HGSIL, consider Laser vaporization.    Iftikhar Roberson MD

## 2023-10-27 NOTE — NURSING NOTE
"Chief Complaint   Patient presents with    Colposcopy     +Other HPV        Initial /68 (BP Location: Left arm, Patient Position: Sitting, Cuff Size: Adult Regular)   Ht 1.626 m (5' 4\")   Wt 65.8 kg (145 lb)   LMP 2023 (Exact Date)   Breastfeeding No   BMI 24.89 kg/m   Estimated body mass index is 24.89 kg/m  as calculated from the following:    Height as of this encounter: 1.626 m (5' 4\").    Weight as of this encounter: 65.8 kg (145 lb).  BP completed using cuff size: regular    Questioned patient about current smoking habits.  Pt. has never smoked.          The following HM Due: NONE      Coy Mcleod CMA                "

## 2023-10-28 LAB
C TRACH DNA SPEC QL NAA+PROBE: NEGATIVE
N GONORRHOEA DNA SPEC QL NAA+PROBE: NEGATIVE

## 2023-10-31 LAB
PATH REPORT.COMMENTS IMP SPEC: NORMAL
PATH REPORT.COMMENTS IMP SPEC: NORMAL
PATH REPORT.FINAL DX SPEC: NORMAL
PATH REPORT.GROSS SPEC: NORMAL
PATH REPORT.MICROSCOPIC SPEC OTHER STN: NORMAL
PATH REPORT.RELEVANT HX SPEC: NORMAL
PHOTO IMAGE: NORMAL

## 2023-10-31 NOTE — RESULT ENCOUNTER NOTE
Please advise patient her colposcopic biopsies from her cervix showed no dysplasia nor cervical cancer.  She does not need anything else done at this time.  She only needs to follow up in 1 year for her annual exam, and at that time a Pap with HPV DNA test will be done again from her cervix to check to see if the cervix cells are normal and the HPV virus has been eradicated.  If so she will resume routine screening 3 years later.  She can let me know if she has any other questions.    Iftikhar Roberson MD

## 2023-11-08 ENCOUNTER — PATIENT OUTREACH (OUTPATIENT)
Dept: OBGYN | Facility: CLINIC | Age: 43
End: 2023-11-08
Payer: COMMERCIAL

## 2023-11-08 DIAGNOSIS — D06.9 HIGH GRADE SQUAMOUS INTRAEPITHELIAL LESION (HGSIL), GRADE 3 CIN, ON BIOPSY OF CERVIX: Primary | ICD-10-CM

## 2024-04-17 ENCOUNTER — OFFICE VISIT (OUTPATIENT)
Dept: URGENT CARE | Facility: URGENT CARE | Age: 44
End: 2024-04-17
Payer: COMMERCIAL

## 2024-04-17 VITALS
OXYGEN SATURATION: 99 % | HEART RATE: 77 BPM | DIASTOLIC BLOOD PRESSURE: 73 MMHG | TEMPERATURE: 98.5 F | SYSTOLIC BLOOD PRESSURE: 115 MMHG | RESPIRATION RATE: 20 BRPM

## 2024-04-17 DIAGNOSIS — H66.90 EAR INFECTION: Primary | ICD-10-CM

## 2024-04-17 PROCEDURE — 99213 OFFICE O/P EST LOW 20 MIN: CPT

## 2024-04-17 RX ORDER — NEOMYCIN SULFATE, POLYMYXIN B SULFATE, HYDROCORTISONE 3.5; 10000; 1 MG/ML; [USP'U]/ML; MG/ML
3 SOLUTION/ DROPS AURICULAR (OTIC) 4 TIMES DAILY
Qty: 10 ML | Refills: 0 | Status: SHIPPED | OUTPATIENT
Start: 2024-04-17 | End: 2024-09-27

## 2024-04-17 NOTE — PATIENT INSTRUCTIONS
Use the ear drops as prescribed and finish the full course even if symptoms improve.  Can use Tylenol and/or ibuprofen as needed for pain.  Maximum dose of Tylenol is 4000mg in a 24 hour period of time.  Take ibuprofen with food to avoid stomach upset.

## 2024-04-17 NOTE — PROGRESS NOTES
ASSESSMENT:  (H66.90) Ear infection  (primary encounter diagnosis)  Plan: neomycin-polymyxin-hydrocortisone (CORTISPORIN)        [id number] otic solution    PLAN:  Informed the patient to use the eardrops as prescribed and finish the full course even if symptoms improve.  We discussed using Tylenol and or ibuprofen as needed for pain with the maximum dose of Tylenol being 4000 mg in a 24-hour period of time and to take ibuprofen with food to avoid upset stomach.  We also discussed the need to return to clinic with any new or worsening symptoms.  Patient acknowledged her understanding of the above plan.    The use of Dragon/Spot formerly [employer] services may have been used to construct the content in this note; any grammatical or spelling errors are non-intentional. Please contact the author of this note directly if you are in need of any clarification.      ELIZA Lomeli CNP    SUBJECTIVE:  Lito Castillo is a 43 year old female who presents with right ear pain since yesterday.  Severity: severe   Additional symptoms include none.    Treatment: nothing    ROS:  Negative except noted above.      OBJECTIVE:  /73 (BP Location: Right arm, Patient Position: Sitting, Cuff Size: Adult Regular)   Pulse 77   Temp 98.5  F (36.9  C) (Tympanic)   Resp 20   LMP 03/27/2024 (Approximate)   SpO2 99%    GENERAL: no acute distress  EYES: EOMI,  PERRL, conjunctiva clear  The right TM is erythematous     The right auditory canal is erythematous  Tenderness reported in the right ear upon exam  The left TM is normal: no effusions, no erythema, and normal landmarks  The left auditory canal is normal and without drainage, edema or erythema  Oropharynx exam is normal: no lesions, erythema, adenopathy or exudate.  NECK: supple, non-tender to palpation, no adenopathy noted  SKIN: no suspicious lesions or rashes

## 2024-09-27 ENCOUNTER — OFFICE VISIT (OUTPATIENT)
Dept: FAMILY MEDICINE | Facility: CLINIC | Age: 44
End: 2024-09-27
Payer: COMMERCIAL

## 2024-09-27 VITALS
TEMPERATURE: 98 F | HEART RATE: 78 BPM | SYSTOLIC BLOOD PRESSURE: 129 MMHG | BODY MASS INDEX: 26.46 KG/M2 | DIASTOLIC BLOOD PRESSURE: 85 MMHG | HEIGHT: 64 IN | WEIGHT: 155 LBS | OXYGEN SATURATION: 100 %

## 2024-09-27 DIAGNOSIS — Z13.29 SCREENING FOR THYROID DISORDER: ICD-10-CM

## 2024-09-27 DIAGNOSIS — H66.90 EAR INFECTION: ICD-10-CM

## 2024-09-27 DIAGNOSIS — Z13.6 CARDIOVASCULAR SCREENING; LDL GOAL LESS THAN 130: ICD-10-CM

## 2024-09-27 DIAGNOSIS — Z12.31 ENCOUNTER FOR SCREENING MAMMOGRAM FOR BREAST CANCER: ICD-10-CM

## 2024-09-27 DIAGNOSIS — Z11.59 NEED FOR HEPATITIS C SCREENING TEST: ICD-10-CM

## 2024-09-27 DIAGNOSIS — Z13.1 SCREENING FOR DIABETES MELLITUS: ICD-10-CM

## 2024-09-27 DIAGNOSIS — Z00.00 ANNUAL PHYSICAL EXAM: Primary | ICD-10-CM

## 2024-09-27 DIAGNOSIS — L29.9 ITCHY SCALP: ICD-10-CM

## 2024-09-27 DIAGNOSIS — Z23 NEED FOR INFLUENZA VACCINATION: ICD-10-CM

## 2024-09-27 DIAGNOSIS — Z11.4 SCREENING FOR HIV (HUMAN IMMUNODEFICIENCY VIRUS): ICD-10-CM

## 2024-09-27 DIAGNOSIS — Z12.11 SCREEN FOR COLON CANCER: ICD-10-CM

## 2024-09-27 DIAGNOSIS — Z12.4 CERVICAL CANCER SCREENING: ICD-10-CM

## 2024-09-27 DIAGNOSIS — D50.9 IRON DEFICIENCY ANEMIA, UNSPECIFIED IRON DEFICIENCY ANEMIA TYPE: ICD-10-CM

## 2024-09-27 LAB
CHOLEST SERPL-MCNC: 203 MG/DL
ERYTHROCYTE [DISTWIDTH] IN BLOOD BY AUTOMATED COUNT: 12 % (ref 10–15)
EST. AVERAGE GLUCOSE BLD GHB EST-MCNC: 114 MG/DL
FASTING STATUS PATIENT QL REPORTED: NO
FERRITIN SERPL-MCNC: 38 NG/ML (ref 6–175)
HBA1C MFR BLD: 5.6 % (ref 0–5.6)
HCT VFR BLD AUTO: 35.1 % (ref 35–47)
HDLC SERPL-MCNC: 58 MG/DL
HGB BLD-MCNC: 11.5 G/DL (ref 11.7–15.7)
HIV 1+2 AB+HIV1 P24 AG SERPL QL IA: NONREACTIVE
IRON BINDING CAPACITY (ROCHE): 274 UG/DL (ref 240–430)
IRON SATN MFR SERPL: 32 % (ref 15–46)
IRON SERPL-MCNC: 89 UG/DL (ref 37–145)
LDLC SERPL CALC-MCNC: 130 MG/DL
MCH RBC QN AUTO: 31.4 PG (ref 26.5–33)
MCHC RBC AUTO-ENTMCNC: 32.8 G/DL (ref 31.5–36.5)
MCV RBC AUTO: 96 FL (ref 78–100)
NONHDLC SERPL-MCNC: 145 MG/DL
PLATELET # BLD AUTO: 232 10E3/UL (ref 150–450)
RBC # BLD AUTO: 3.66 10E6/UL (ref 3.8–5.2)
TRIGL SERPL-MCNC: 73 MG/DL
TSH SERPL DL<=0.005 MIU/L-ACNC: 1.27 UIU/ML (ref 0.3–4.2)
WBC # BLD AUTO: 6.9 10E3/UL (ref 4–11)

## 2024-09-27 PROCEDURE — 99396 PREV VISIT EST AGE 40-64: CPT | Mod: 57

## 2024-09-27 PROCEDURE — 82728 ASSAY OF FERRITIN: CPT

## 2024-09-27 PROCEDURE — 90656 IIV3 VACC NO PRSV 0.5 ML IM: CPT

## 2024-09-27 PROCEDURE — 99214 OFFICE O/P EST MOD 30 MIN: CPT | Mod: 25

## 2024-09-27 PROCEDURE — 83550 IRON BINDING TEST: CPT

## 2024-09-27 PROCEDURE — G0145 SCR C/V CYTO,THINLAYER,RESCR: HCPCS

## 2024-09-27 PROCEDURE — 85027 COMPLETE CBC AUTOMATED: CPT

## 2024-09-27 PROCEDURE — 83036 HEMOGLOBIN GLYCOSYLATED A1C: CPT

## 2024-09-27 PROCEDURE — 83540 ASSAY OF IRON: CPT

## 2024-09-27 PROCEDURE — 90471 IMMUNIZATION ADMIN: CPT

## 2024-09-27 PROCEDURE — 87389 HIV-1 AG W/HIV-1&-2 AB AG IA: CPT

## 2024-09-27 PROCEDURE — 87624 HPV HI-RISK TYP POOLED RSLT: CPT

## 2024-09-27 PROCEDURE — 80061 LIPID PANEL: CPT

## 2024-09-27 PROCEDURE — 84443 ASSAY THYROID STIM HORMONE: CPT

## 2024-09-27 PROCEDURE — 36415 COLL VENOUS BLD VENIPUNCTURE: CPT

## 2024-09-27 RX ORDER — CLOBETASOL PROPIONATE 0.05 G/100ML
SHAMPOO TOPICAL
Qty: 590 ML | Refills: 3 | Status: SHIPPED | OUTPATIENT
Start: 2024-09-27

## 2024-09-27 RX ORDER — NEOMYCIN SULFATE, POLYMYXIN B SULFATE, HYDROCORTISONE 3.5; 10000; 1 MG/ML; [USP'U]/ML; MG/ML
3 SOLUTION/ DROPS AURICULAR (OTIC) 4 TIMES DAILY
Qty: 10 ML | Refills: 0 | Status: SHIPPED | OUTPATIENT
Start: 2024-09-27

## 2024-09-27 ASSESSMENT — PAIN SCALES - GENERAL: PAINLEVEL: NO PAIN (0)

## 2024-09-27 ASSESSMENT — SOCIAL DETERMINANTS OF HEALTH (SDOH): HOW OFTEN DO YOU GET TOGETHER WITH FRIENDS OR RELATIVES?: ONCE A WEEK

## 2024-09-27 NOTE — PROGRESS NOTES
Preventive Care Visit  [facility] JOSE FRANCISCO Valdovinos DNP, Geriatric Medicine  Sep 27, 2024      Assessment & Plan     Annual physical exam  UTD on mammogram, will provide order for colonoscopy. Due for pap today. Will update labs and vaccines    Itchy scalp  Symptoms per HPI, reports success with steroid shampoo so will provide. Advised follow-up with dermatology for further evaluation/recommendations   - Adult Dermatology  Referral; Future  - clobetasol propionate (CLOBEX) 0.05 % external shampoo; Use as needed for itchy scalp    Iron deficiency anemia, unspecified iron deficiency anemia type  Hx of anemia, intermittently takes iron supplementation. Will repeat CBC and add iron studies  - CBC with platelets  - Iron and iron binding capacity  - Ferritin    Ear infection  Hx of ear infection, still with intermittent itching/draining (no pain). Will refill ear drops to have when symptoms arise  - neomycin-polymyxin-hydrocortisone (CORTISPORIN) 3.5-69270-4 otic solution; Place 3 drops in ear(s) 4 times daily.    Cervical cancer screening  Hx of abnormal s/p colposcopy  - HPV and Gynecologic Cytology Panel - Recommended Age 30 - 65 Years    Screening for HIV (human immunodeficiency virus)  - HIV Antigen Antibody Combo    Need for influenza vaccination  - INFLUENZA VACCINE,SPLIT VIRUS,TRIVALENT,PF(FLUZONE)    Encounter for screening mammogram for breast cancer  Will provide number to schedule mammogram  - MA Screen Bilateral w/Shon; Future    Screening for thyroid disorder  - TSH with free T4 reflex    Screening for diabetes mellitus  - Hemoglobin A1c    CARDIOVASCULAR SCREENING; LDL GOAL LESS THAN 130  - Lipid panel reflex to direct LDL Fasting    Screen for colon cancer  Turning 44 next month, will have referral available to schedule  - Colonoscopy Screening  Referral; Future    Patient has been advised of split billing requirements and indicates understanding:  "Yes        BMI  Estimated body mass index is 26.4 kg/m  as calculated from the following:    Height as of this encounter: 1.632 m (5' 4.25\").    Weight as of this encounter: 70.3 kg (155 lb).   Weight management plan: Discussed healthy diet and exercise guidelines    Counseling  Appropriate preventive services were addressed with this patient via screening, questionnaire, or discussion as appropriate for fall prevention, nutrition, physical activity, Tobacco-use cessation, social engagement, weight loss and cognition.  Checklist reviewing preventive services available has been given to the patient.  Reviewed patient's diet, addressing concerns and/or questions.   She is at risk for psychosocial distress and has been provided with information to reduce risk.   The patient reports drinking more than 3 alcoholic drinks per day and/or more than 7 drhnks per week. The patient was counseled and given information about possible harmful effects of excessive alcohol intake.    FUTURE APPOINTMENTS:       - Follow-up for annual visit or as needed  Regular exercise    Ronni Morse is a 43 year old, presenting for the following:  Physical         Health Care Directive  Patient does not have a Health Care Directive or Living Will:     Here for physical  Notes she has had an itchy scalp for several years, has tried a steroid shampoo in the past that has been helpful. Also notes some itchy skin throughout her body  Had right ear infection earlier this year, was treated with ear drops but will intermittently have some itching and draining still from it                    9/27/2024   General Health   How would you rate your overall physical health? Good   Feel stress (tense, anxious, or unable to sleep) Only a little      (!) STRESS CONCERN      9/27/2024   Nutrition   Three or more servings of calcium each day? Yes   Diet: Regular (no restrictions)   How many servings of fruit and vegetables per day? (!) 0-1   How many " sweetened beverages each day? 0-1            9/27/2024   Exercise   Days per week of moderate/strenous exercise 0 days      (!) EXERCISE CONCERN      9/27/2024   Social Factors   Frequency of gathering with friends or relatives Once a week   Worry food won't last until get money to buy more No   Food not last or not have enough money for food? No   Do you have housing? (Housing is defined as stable permanent housing and does not include staying ouside in a car, in a tent, in an abandoned building, in an overnight shelter, or couch-surfing.) Yes   Are you worried about losing your housing? No   Lack of transportation? No   Unable to get utilities (heat,electricity)? No            9/27/2024   Dental   Dentist two times every year? Yes            9/27/2024   TB Screening   Were you born outside of the [place]? No            Today's PHQ-2 Score:       9/27/2024     8:57 AM   PHQ-2 ( 1999 [employer])   Q1: Little interest or pleasure in doing things 0   Q2: Feeling down, depressed or hopeless 0   PHQ-2 Score 0   Q1: Little interest or pleasure in doing things Not at all   Q2: Feeling down, depressed or hopeless Not at all   PHQ-2 Score 0           9/27/2024   Substance Use   Alcohol more than 3/day or more than 7/wk Yes   How often do you have a drink containing alcohol Monthly or less   How many alcohol drinks on typical day 1 or 2   How often do you have 5+ drinks at one occasion Never   Audit 2/3 Score 0   How often not able to stop drinking once started Never   How often failed to do what normally expected Never   How often needed first drink in am after a heavy drinking session Never   How often feeling of guilt or remorse after drinking Never   How often unable to remember what happened the night before Never   Have you or someone else been injured because of your drinking No   Has anyone been concerned or suggested you cut down on drinking No   TOTAL SCORE - AUDIT 1   Do you use any other substances recreationally? No         Social History     Tobacco Use    Smoking status: Never    Smokeless tobacco: Never   Vaping Use    Vaping status: Never Used   Substance Use Topics    Alcohol use: Yes     Comment: 1 glass wine per week    Drug use: Never           10/13/2023   LAST FHS-7 RESULTS   1st degree relative breast or ovarian cancer No   Any relative bilateral breast cancer No   Any male have breast cancer No   Any ONE woman have BOTH breast AND ovarian cancer No   Any woman with breast cancer before 50yrs No   2 or more relatives with breast AND/OR ovarian cancer No   2 or more relatives with breast AND/OR bowel cancer No           Mammogram Screening - Mammogram every 1-2 years updated in Health Maintenance based on mutual decision making          9/27/2024   One time HIV Screening   Previous HIV test? No          9/27/2024   STI Screening   New sexual partner(s) since last STI/HIV test? No        History of abnormal Pap smear: YES - reflected in Problem List and Health Maintenance accordingly        Latest Ref Rng & Units 9/11/2023    11:50 AM   PAP / HPV   PAP  Negative for Intraepithelial Lesion or Malignancy (NILM)    HPV 16 DNA Negative Negative    HPV 18 DNA Negative Negative    Other HR HPV Negative Positive      ASCVD Risk   The 10-year ASCVD risk score (Kelly CAGLE, et al., 2019) is: 0.8%    Values used to calculate the score:      Age: 43 years      Sex: Female      Is Non- : No      Diabetic: No      Tobacco smoker: No      Systolic Blood Pressure: 129 mmHg      Is BP treated: No      HDL Cholesterol: 61 mg/dL      Total Cholesterol: 219 mg/dL        9/27/2024   Contraception/Family Planning   Questions about contraception or family planning No           Reviewed and updated as needed this visit by Provider   Tobacco   Meds  Problems  Med Hx  Surg Hx  Fam Hx  Soc Hx Sexual   Activity          Past Medical History:   Diagnosis Date    Abnormal Pap smear of cervix     Cervical high risk  "HPV (human papillomavirus) test positive 08/31/2021    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     Objective    Exam  /85 (BP Location: Right arm, Patient Position: Sitting, Cuff Size: Adult Regular)   Pulse 78   Temp 98  F (36.7  C) (Oral)   Ht 1.632 m (5' 4.25\")   Wt 70.3 kg (155 lb)   LMP 08/27/2024 (Approximate)   SpO2 100%   Breastfeeding No   BMI 26.40 kg/m     Estimated body mass index is 26.4 kg/m  as calculated from the following:    Height as of this encounter: 1.632 m (5' 4.25\").    Weight as of this encounter: 70.3 kg (155 lb).    Physical Exam  Vitals reviewed.   HENT:      Head: Normocephalic.      Right Ear: Ear canal and external ear normal. There is impacted cerumen.      Left Ear: Ear canal and external ear normal. There is impacted cerumen.   Eyes:      Pupils: Pupils are equal, round, and reactive to light.   Cardiovascular:      Rate and Rhythm: Normal rate and regular rhythm.      Pulses: Normal pulses.      Heart sounds: Normal heart sounds. No murmur heard.  Pulmonary:      Effort: Pulmonary effort is normal. No respiratory distress.      Breath sounds: Normal breath sounds. No wheezing, rhonchi or rales.   Chest:   Breasts:     Right: Normal.      Left: Normal.      Comments: Dense tissue bilaterally, hx of right breast mass  Abdominal:      General: Bowel sounds are normal. There is no distension.      Palpations: Abdomen is soft. There is no mass.      Tenderness: There is no abdominal tenderness.   Musculoskeletal:         General: Normal range of motion.      Cervical back: Normal range of motion and neck supple.      Right lower leg: No edema.      Left lower leg: No edema.   Skin:     General: Skin " is warm and dry.   Neurological:      Mental Status: She is alert and oriented to person, place, and time.   Psychiatric:         Mood and Affect: Mood normal.         Behavior: Behavior normal.               Signed Electronically by: Jerri Valdovinos DNP, APRN, CNP

## 2024-09-30 LAB
HPV HR 12 DNA CVX QL NAA+PROBE: NEGATIVE
HPV16 DNA CVX QL NAA+PROBE: NEGATIVE
HPV18 DNA CVX QL NAA+PROBE: NEGATIVE
HUMAN PAPILLOMA VIRUS FINAL DIAGNOSIS: NORMAL

## 2024-10-02 ENCOUNTER — PATIENT OUTREACH (OUTPATIENT)
Dept: FAMILY MEDICINE | Facility: CLINIC | Age: 44
End: 2024-10-02
Payer: COMMERCIAL

## 2024-10-02 LAB
BKR AP ASSOCIATED HPV REPORT: NORMAL
BKR LAB AP GYN ADEQUACY: NORMAL
BKR LAB AP GYN INTERPRETATION: NORMAL
BKR LAB AP PREVIOUS ABNL DX: NORMAL
BKR LAB AP PREVIOUS ABNORMAL: NORMAL
PATH REPORT.COMMENTS IMP SPEC: NORMAL
PATH REPORT.COMMENTS IMP SPEC: NORMAL
PATH REPORT.RELEVANT HX SPEC: NORMAL

## 2024-11-22 ENCOUNTER — HOSPITAL ENCOUNTER (OUTPATIENT)
Dept: MAMMOGRAPHY | Facility: CLINIC | Age: 44
Discharge: HOME | End: 2024-11-22
Payer: COMMERCIAL

## 2024-11-22 DIAGNOSIS — Z12.31 ENCOUNTER FOR SCREENING MAMMOGRAM FOR BREAST CANCER: ICD-10-CM

## 2024-11-22 PROCEDURE — 77067 SCR MAMMO BI INCL CAD: CPT

## 2024-11-22 PROCEDURE — 77063 BREAST TOMOSYNTHESIS BI: CPT

## 2025-02-05 ENCOUNTER — OFFICE VISIT (OUTPATIENT)
Dept: DERMATOLOGY | Facility: CLINIC | Age: 45
End: 2025-02-05
Payer: COMMERCIAL

## 2025-02-05 DIAGNOSIS — L21.9 DERMATITIS, SEBORRHEIC: Primary | ICD-10-CM

## 2025-02-05 RX ORDER — KETOCONAZOLE 20 MG/ML
SHAMPOO, SUSPENSION TOPICAL
Qty: 120 ML | Refills: 11 | Status: SHIPPED | OUTPATIENT
Start: 2025-02-05

## 2025-02-05 RX ORDER — BETAMETHASONE DIPROPIONATE 0.5 MG/ML
LOTION, AUGMENTED TOPICAL DAILY
Qty: 120 ML | Refills: 11 | Status: SHIPPED | OUTPATIENT
Start: 2025-02-05

## 2025-02-05 NOTE — LETTER
2/5/2025      Lito Castillo  [address] [facility] [address]      Dear Colleague,    Thank you for referring your patient, Lito Castillo, to the [facility]. Please see a copy of my visit note below.    [facility] Dermatology Note    Encounter Date: Feb 5, 2025    Dermatology Problem List:  1. Seborrheic dermatitis  - current tx: augmented betamethasone lotion, ketoconazole shampoo  2. SK    ______________________________________    Impression/Plan:  1. Seborrheic dermatitis, scalp  - discussed pathophysiology in detail   - discussed topical treatment in detail   - start augmented betamethasone lotion-once a day to scalp. Advised patient, if lotion too thick, may try a scalp oil.  - start ketoconazole shampoo 2% shampoo scalp 1-2 times a week  - recommend coal tar shampoo or Head and shoulders shampoo/conditioner    2. Seborrheic keratosis, non irritated  - Reassurance provided      Follow-up in prn.       Staff Involved:  Staff and Scribe    Scribe Disclosure:   I, Genesis Flanagan, am serving as a scribe; to document services personally performed by Juancarlos Page MD -based on data collection and the provider's statements to me.     Provider Disclosure:   The documentation recorded by the scribe accurately reflects the services I personally performed and the decisions made by me.    Juancarlos Page MD   of Dermatology  Department of Dermatology  [facility]        CC:   Chief Complaint   Patient presents with     Derm Problem     Itchy scalp, had it for several years. Been using Clobex shampoo. Pt states shampoo does help, but has been temporary solution.        History of Present Illness:  Ms. Lito Castillo is a 44 year old female who presents as a new patient.    Here for itchy scalp. She states that she has had itchy scalp for several years. Patient has tried clobex shampoo, which has helped but only a  temporary solution.  Her scalp will get flaky and red at times,mostly at the frontal scalp.    Labs:  N/a    Physical exam:  Vitals: There were no vitals taken for this visit.  GEN: This is a well developed, well-nourished female in no acute distress, in a pleasant mood.    SKIN: Abernathy phototype IV  - Focused examination of the face and scalp was performed.  - There is a waxy stuck on tan to brown papule on the left temporal scalp.  - There is macular erythema of the scalp with mild flaky white scale.  - No other lesions of concern on areas examined.     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 She has never used smokeless tobacco. She reports current alcohol use. She reports that she does not use drugs.    Family History:  Family History   Problem Relation Age of Onset     Diabetes Type 2  Mother      Stomach Cancer Father      Stomach Cancer Paternal Uncle        Medications:  Current Outpatient Medications   Medication Sig Dispense Refill     clobetasol propionate (CLOBEX) 0.05 % external shampoo Use as needed for itchy scalp 590 mL 3     neomycin-polymyxin-hydrocortisone (CORTISPORIN) 3.5-89886-7 otic solution Place 3 drops in ear(s) 4 times daily. (Patient not taking: Reported on 2/5/2025) 10 mL 0     No Known Allergies      Again, thank you for allowing me to participate in the care of your patient.        Sincerely,    Juancarlos Page MD    Electronically signed

## 2025-02-05 NOTE — NURSING NOTE
Lito Castillo's goals for this visit include:   Chief Complaint   Patient presents with    Derm Problem     Itchy scalp, had it for several years. Been using Clobex shampoo. Pt states shampoo does help, but has been temporary solution.        She requests these members of her care team be copied on today's visit information:     PCP: Kyra Garsia    Referring Provider:  Jerri Valdovinos, LANCE  [address] [place] [address]    There were no vitals taken for this visit.    Do you need any medication refills at today's visit?     Taylor Mancia MA on 2/5/2025 at 10:55 AM

## 2025-02-05 NOTE — PATIENT INSTRUCTIONS
Coal tar 1% shampoo (T/Gel or generic equivalent)    Head & Shoulders conditioner (zinc pyrithione)

## 2025-02-05 NOTE — PROGRESS NOTES
[facility] Dermatology Note    Encounter Date: Feb 5, 2025    Dermatology Problem List:  1. Seborrheic dermatitis  - current tx: augmented betamethasone lotion, ketoconazole shampoo  2. SK    ______________________________________    Impression/Plan:  1. Seborrheic dermatitis, scalp  - discussed pathophysiology in detail   - discussed topical treatment in detail   - start augmented betamethasone lotion-once a day to scalp. Advised patient, if lotion too thick, may try a scalp oil.  - start ketoconazole shampoo 2% shampoo scalp 1-2 times a week  - recommend coal tar shampoo or Head and shoulders shampoo/conditioner    2. Seborrheic keratosis, non irritated  - Reassurance provided      Follow-up in prn.       Staff Involved:  Staff and Scribe    Scribe Disclosure:   I, Genesis Flanagan, am serving as a scribe; to document services personally performed by Juancarlos Page MD -based on data collection and the provider's statements to me.     Provider Disclosure:   The documentation recorded by the scribe accurately reflects the services I personally performed and the decisions made by me.    Juancarlos Page MD   of Dermatology  Department of Dermatology  [facility]        CC:   Chief Complaint   Patient presents with    Derm Problem     Itchy scalp, had it for several years. Been using Clobex shampoo. Pt states shampoo does help, but has been temporary solution.        History of Present Illness:  Ms. Lito Castillo is a 44 year old female who presents as a new patient.    Here for itchy scalp. She states that she has had itchy scalp for several years. Patient has tried clobex shampoo, which has helped but only a temporary solution.  Her scalp will get flaky and red at times,mostly at the frontal scalp.    Labs:  N/a    Physical exam:  Vitals: There were no vitals taken for this visit.  GEN: This is a well developed, well-nourished female in no acute  distress, in a pleasant mood.    SKIN: Abernathy phototype IV  - Focused examination of the face and scalp was performed.  - There is a waxy stuck on tan to brown papule on the left temporal scalp.  - There is macular erythema of the scalp with mild flaky white scale.  - No other lesions of concern on areas examined.     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 She has never used smokeless tobacco. She reports current alcohol use. She reports that she does not use drugs.    Family History:  Family History   Problem Relation Age of Onset    Diabetes Type 2  Mother     Stomach Cancer Father     Stomach Cancer Paternal Uncle        Medications:  Current Outpatient Medications   Medication Sig Dispense Refill    clobetasol propionate (CLOBEX) 0.05 % external shampoo Use as needed for itchy scalp 590 mL 3    neomycin-polymyxin-hydrocortisone (CORTISPORIN) 3.5-69991-9 otic solution Place 3 drops in ear(s) 4 times daily. (Patient not taking: Reported on 2/5/2025) 10 mL 0     No Known Allergies

## 2025-08-28 ENCOUNTER — PATIENT OUTREACH (OUTPATIENT)
Dept: CARE COORDINATION | Facility: CLINIC | Age: 45
End: 2025-08-28
Payer: COMMERCIAL